# Patient Record
Sex: FEMALE | Race: WHITE | NOT HISPANIC OR LATINO
[De-identification: names, ages, dates, MRNs, and addresses within clinical notes are randomized per-mention and may not be internally consistent; named-entity substitution may affect disease eponyms.]

---

## 2018-10-09 PROBLEM — Z00.00 ENCOUNTER FOR PREVENTIVE HEALTH EXAMINATION: Status: ACTIVE | Noted: 2018-10-09

## 2018-10-18 ENCOUNTER — APPOINTMENT (OUTPATIENT)
Dept: OBGYN | Facility: CLINIC | Age: 28
End: 2018-10-18
Payer: COMMERCIAL

## 2018-10-18 VITALS — DIASTOLIC BLOOD PRESSURE: 70 MMHG | HEIGHT: 66 IN | SYSTOLIC BLOOD PRESSURE: 110 MMHG

## 2018-10-18 DIAGNOSIS — Z80.0 FAMILY HISTORY OF MALIGNANT NEOPLASM OF DIGESTIVE ORGANS: ICD-10-CM

## 2018-10-18 DIAGNOSIS — F41.8 OTHER SPECIFIED ANXIETY DISORDERS: ICD-10-CM

## 2018-10-18 DIAGNOSIS — Z78.9 OTHER SPECIFIED HEALTH STATUS: ICD-10-CM

## 2018-10-18 DIAGNOSIS — Z83.3 FAMILY HISTORY OF DIABETES MELLITUS: ICD-10-CM

## 2018-10-18 PROCEDURE — 99203 OFFICE O/P NEW LOW 30 MIN: CPT

## 2018-10-18 RX ORDER — ESCITALOPRAM OXALATE 10 MG/1
10 TABLET, FILM COATED ORAL
Refills: 0 | Status: ACTIVE | COMMUNITY

## 2019-03-05 ENCOUNTER — APPOINTMENT (OUTPATIENT)
Dept: OBGYN | Facility: CLINIC | Age: 29
End: 2019-03-05
Payer: COMMERCIAL

## 2019-03-05 VITALS
HEIGHT: 66 IN | WEIGHT: 260 LBS | SYSTOLIC BLOOD PRESSURE: 118 MMHG | DIASTOLIC BLOOD PRESSURE: 80 MMHG | BODY MASS INDEX: 41.78 KG/M2

## 2019-03-05 DIAGNOSIS — R39.11 HESITANCY OF MICTURITION: ICD-10-CM

## 2019-03-05 PROCEDURE — 99213 OFFICE O/P EST LOW 20 MIN: CPT

## 2019-05-14 ENCOUNTER — APPOINTMENT (OUTPATIENT)
Dept: OBGYN | Facility: CLINIC | Age: 29
End: 2019-05-14
Payer: COMMERCIAL

## 2019-05-14 VITALS
HEIGHT: 66 IN | BODY MASS INDEX: 44.36 KG/M2 | WEIGHT: 276 LBS | HEART RATE: 79 BPM | DIASTOLIC BLOOD PRESSURE: 78 MMHG | SYSTOLIC BLOOD PRESSURE: 121 MMHG

## 2019-05-14 PROCEDURE — 99395 PREV VISIT EST AGE 18-39: CPT

## 2019-05-14 NOTE — PHYSICAL EXAM
[Awake] : awake [Alert] : alert [Soft] : soft [Oriented x3] : oriented to person, place, and time [Labia Minora] : labia minora [Normal] : clitoris [Labia Majora] : labia major [No Bleeding] : there was no active vaginal bleeding [Pap Obtained] : a Pap smear was performed [Uterine Adnexae] : were not tender and not enlarged [LAD] : no lymphadenopathy [Acute Distress] : no acute distress [Thyroid Nodule] : no thyroid nodule [Goiter] : no goiter [Mass] : no breast mass [Axillary LAD] : no axillary lymphadenopathy [Nipple Discharge] : no nipple discharge [H/Smegaly] : no hepatosplenomegaly [Tender] : non tender [Distended] : not distended [Depressed Mood] : not depressed [Flat Affect] : affect not flat

## 2019-05-14 NOTE — COUNSELING
[Exercise] : exercise [Nutrition] : nutrition [Breast Self Exam] : breast self exam [Vitamins/Supplements] : vitamins/supplements [Contraception] : contraception

## 2019-05-14 NOTE — HISTORY OF PRESENT ILLNESS
[6 Months Ago] : 6 months ago [Good] : being in good health [Last Pap ___] : Last cervical pap smear was [unfilled] [Healthy Diet] : a healthy diet [Weight Concerns] : weight concens

## 2019-05-17 LAB — CYTOLOGY CVX/VAG DOC THIN PREP: NORMAL

## 2019-07-22 ENCOUNTER — MEDICATION RENEWAL (OUTPATIENT)
Age: 29
End: 2019-07-22

## 2020-03-10 ENCOUNTER — EMERGENCY (EMERGENCY)
Facility: HOSPITAL | Age: 30
LOS: 1 days | Discharge: HOME | End: 2020-03-10
Attending: EMERGENCY MEDICINE
Payer: COMMERCIAL

## 2020-03-10 VITALS
HEIGHT: 66 IN | TEMPERATURE: 99 F | RESPIRATION RATE: 16 BRPM | HEART RATE: 96 BPM | DIASTOLIC BLOOD PRESSURE: 80 MMHG | WEIGHT: 279.99 LBS | OXYGEN SATURATION: 100 % | SYSTOLIC BLOOD PRESSURE: 129 MMHG

## 2020-03-10 VITALS
HEART RATE: 80 BPM | OXYGEN SATURATION: 97 % | RESPIRATION RATE: 18 BRPM | DIASTOLIC BLOOD PRESSURE: 71 MMHG | SYSTOLIC BLOOD PRESSURE: 106 MMHG

## 2020-03-10 DIAGNOSIS — F32.9 MAJOR DEPRESSIVE DISORDER, SINGLE EPISODE, UNSPECIFIED: ICD-10-CM

## 2020-03-10 DIAGNOSIS — F41.9 ANXIETY DISORDER, UNSPECIFIED: ICD-10-CM

## 2020-03-10 LAB
ALBUMIN SERPL ELPH-MCNC: 4.4 G/DL — SIGNIFICANT CHANGE UP (ref 3.3–5.2)
ALP SERPL-CCNC: 71 U/L — SIGNIFICANT CHANGE UP (ref 40–120)
ALT FLD-CCNC: 21 U/L — SIGNIFICANT CHANGE UP
ANION GAP SERPL CALC-SCNC: 12 MMOL/L — SIGNIFICANT CHANGE UP (ref 5–17)
APAP SERPL-MCNC: <7.5 UG/ML — LOW (ref 10–26)
APPEARANCE UR: CLEAR — SIGNIFICANT CHANGE UP
AST SERPL-CCNC: 21 U/L — SIGNIFICANT CHANGE UP
BASOPHILS # BLD AUTO: 0.03 K/UL — SIGNIFICANT CHANGE UP (ref 0–0.2)
BASOPHILS NFR BLD AUTO: 0.3 % — SIGNIFICANT CHANGE UP (ref 0–2)
BILIRUB SERPL-MCNC: 0.3 MG/DL — LOW (ref 0.4–2)
BILIRUB UR-MCNC: NEGATIVE — SIGNIFICANT CHANGE UP
BUN SERPL-MCNC: 7 MG/DL — LOW (ref 8–20)
CALCIUM SERPL-MCNC: 9.2 MG/DL — SIGNIFICANT CHANGE UP (ref 8.6–10.2)
CHLORIDE SERPL-SCNC: 101 MMOL/L — SIGNIFICANT CHANGE UP (ref 98–107)
CO2 SERPL-SCNC: 23 MMOL/L — SIGNIFICANT CHANGE UP (ref 22–29)
COLOR SPEC: YELLOW — SIGNIFICANT CHANGE UP
CREAT SERPL-MCNC: 0.55 MG/DL — SIGNIFICANT CHANGE UP (ref 0.5–1.3)
DIFF PNL FLD: ABNORMAL
EOSINOPHIL # BLD AUTO: 0.15 K/UL — SIGNIFICANT CHANGE UP (ref 0–0.5)
EOSINOPHIL NFR BLD AUTO: 1.3 % — SIGNIFICANT CHANGE UP (ref 0–6)
ETHANOL SERPL-MCNC: <10 MG/DL — SIGNIFICANT CHANGE UP
GLUCOSE SERPL-MCNC: 85 MG/DL — SIGNIFICANT CHANGE UP (ref 70–99)
GLUCOSE UR QL: NEGATIVE MG/DL — SIGNIFICANT CHANGE UP
HCG UR QL: NEGATIVE — SIGNIFICANT CHANGE UP
HCT VFR BLD CALC: 42.7 % — SIGNIFICANT CHANGE UP (ref 34.5–45)
HGB BLD-MCNC: 13.7 G/DL — SIGNIFICANT CHANGE UP (ref 11.5–15.5)
IMM GRANULOCYTES NFR BLD AUTO: 0.3 % — SIGNIFICANT CHANGE UP (ref 0–1.5)
KETONES UR-MCNC: NEGATIVE — SIGNIFICANT CHANGE UP
LEUKOCYTE ESTERASE UR-ACNC: NEGATIVE — SIGNIFICANT CHANGE UP
LYMPHOCYTES # BLD AUTO: 3.7 K/UL — HIGH (ref 1–3.3)
LYMPHOCYTES # BLD AUTO: 32.3 % — SIGNIFICANT CHANGE UP (ref 13–44)
MCHC RBC-ENTMCNC: 27.5 PG — SIGNIFICANT CHANGE UP (ref 27–34)
MCHC RBC-ENTMCNC: 32.1 GM/DL — SIGNIFICANT CHANGE UP (ref 32–36)
MCV RBC AUTO: 85.6 FL — SIGNIFICANT CHANGE UP (ref 80–100)
MONOCYTES # BLD AUTO: 0.48 K/UL — SIGNIFICANT CHANGE UP (ref 0–0.9)
MONOCYTES NFR BLD AUTO: 4.2 % — SIGNIFICANT CHANGE UP (ref 2–14)
NEUTROPHILS # BLD AUTO: 7.06 K/UL — SIGNIFICANT CHANGE UP (ref 1.8–7.4)
NEUTROPHILS NFR BLD AUTO: 61.6 % — SIGNIFICANT CHANGE UP (ref 43–77)
NITRITE UR-MCNC: NEGATIVE — SIGNIFICANT CHANGE UP
PCP SPEC-MCNC: SIGNIFICANT CHANGE UP
PH UR: 8 — SIGNIFICANT CHANGE UP (ref 5–8)
PLATELET # BLD AUTO: 296 K/UL — SIGNIFICANT CHANGE UP (ref 150–400)
POTASSIUM SERPL-MCNC: 3.9 MMOL/L — SIGNIFICANT CHANGE UP (ref 3.5–5.3)
POTASSIUM SERPL-SCNC: 3.9 MMOL/L — SIGNIFICANT CHANGE UP (ref 3.5–5.3)
PROT SERPL-MCNC: 7.6 G/DL — SIGNIFICANT CHANGE UP (ref 6.6–8.7)
PROT UR-MCNC: NEGATIVE MG/DL — SIGNIFICANT CHANGE UP
RBC # BLD: 4.99 M/UL — SIGNIFICANT CHANGE UP (ref 3.8–5.2)
RBC # FLD: 12.9 % — SIGNIFICANT CHANGE UP (ref 10.3–14.5)
SALICYLATES SERPL-MCNC: <0.6 MG/DL — LOW (ref 10–20)
SODIUM SERPL-SCNC: 136 MMOL/L — SIGNIFICANT CHANGE UP (ref 135–145)
SP GR SPEC: 1.01 — SIGNIFICANT CHANGE UP (ref 1.01–1.02)
UROBILINOGEN FLD QL: NEGATIVE MG/DL — SIGNIFICANT CHANGE UP
WBC # BLD: 11.45 K/UL — HIGH (ref 3.8–10.5)
WBC # FLD AUTO: 11.45 K/UL — HIGH (ref 3.8–10.5)

## 2020-03-10 PROCEDURE — 99285 EMERGENCY DEPT VISIT HI MDM: CPT

## 2020-03-10 PROCEDURE — 81001 URINALYSIS AUTO W/SCOPE: CPT

## 2020-03-10 PROCEDURE — 99283 EMERGENCY DEPT VISIT LOW MDM: CPT

## 2020-03-10 PROCEDURE — 90792 PSYCH DIAG EVAL W/MED SRVCS: CPT

## 2020-03-10 PROCEDURE — 80053 COMPREHEN METABOLIC PANEL: CPT

## 2020-03-10 PROCEDURE — 80307 DRUG TEST PRSMV CHEM ANLYZR: CPT

## 2020-03-10 PROCEDURE — 81025 URINE PREGNANCY TEST: CPT

## 2020-03-10 PROCEDURE — 85027 COMPLETE CBC AUTOMATED: CPT

## 2020-03-10 PROCEDURE — 36415 COLL VENOUS BLD VENIPUNCTURE: CPT

## 2020-03-10 RX ORDER — ESCITALOPRAM OXALATE 10 MG/1
1 TABLET, FILM COATED ORAL
Qty: 15 | Refills: 1
Start: 2020-03-10 | End: 2020-04-08

## 2020-03-10 NOTE — ED ADULT TRIAGE NOTE - CHIEF COMPLAINT QUOTE
Pt with a h/o anxiety and depression on Lexapro recently started going to therapy last week. Today was her 2nd session and the doctor suggested that she come here to admit herself for suicidal ideation. Pt reports that she has no plan. Denies any recent new stressors.

## 2020-03-10 NOTE — ED ADULT NURSE NOTE - OBJECTIVE STATEMENT
Patient is a 29 year old female, A&Ox3 in no apparent distress. c/o psychiatric evaluation. Patient presents with anxiety and depression and was unable to make an appointment with a therapist. Patient states she wanted to admit herself for depression, anxiety, and suicidal ideations with no plan.

## 2020-03-10 NOTE — ED BEHAVIORAL HEALTH ASSESSMENT NOTE - RISK ASSESSMENT
Despite chronic passive suicidal ideation, patient has no h/o prior suicide attempt, denies any current S/H I/I/P, has good support, is help seeking, future oriented with multiple protective factors. Low Acute Suicide Risk

## 2020-03-10 NOTE — ED ADULT TRIAGE NOTE - NS_BH TRG Q4C_ED_A_ED
Justyna Patel MA     No

## 2020-03-10 NOTE — ED ADULT TRIAGE NOTE - NS ED TRIAGE AVPU SCALE
Problem: Potential for Falls  Goal: Patient will remain free of falls  INTERVENTIONS:  - Assess patient frequently for physical needs  -  Identify cognitive and physical deficits and behaviors that affect risk of falls  -  [place] fall precautions as indicated by assessment   - Educate patient/family on patient safety including physical limitations  - Instruct patient to call for assistance with activity based on assessment  - Modify environment to reduce risk of injury  - Consider OT/PT consult to assist with strengthening/mobility   Outcome: Progressing      Problem: SAFETY ADULT  Goal: Patient will remain free of falls  INTERVENTIONS:  - Assess patient frequently for physical needs  -  Identify cognitive and physical deficits and behaviors that affect risk of falls  -  [place]rington fall precautions as indicated by assessment   - Educate patient/family on patient safety including physical limitations  - Instruct patient to call for assistance with activity based on assessment  - Modify environment to reduce risk of injury  - Consider OT/PT consult to assist with strengthening/mobility   Outcome: Progressing      Problem: Knowledge Deficit  Goal: Patient/family/caregiver demonstrates understanding of disease process, treatment plan, medications, and discharge instructions  Complete learning assessment and assess knowledge base    Interventions:  - Provide teaching at level of understanding  - Provide teaching via preferred learning methods  Outcome: Progressing
Alert-The patient is alert, awake and responds to voice. The patient is oriented to time, place, and person. The triage nurse is able to obtain subjective information.

## 2020-03-10 NOTE — ED BEHAVIORAL HEALTH ASSESSMENT NOTE - SAFETY PLAN ADDT'L DETAILS
SCOTT FROM [facility] REQUESTING ORDER BONE DENTISITY TEST. PLEASE ADVISE.      [phone]   Provision of National Suicide Prevention Lifeline 5-260-125-QBPU (5948)/Safety plan discussed with.../Education provided regarding environmental safety / lethal means restriction

## 2020-03-10 NOTE — ED STATDOCS - PROGRESS NOTE DETAILS
Psych made aware of patient Patient cleared by psych medication increased by psych physician to 15mg of Lexapro.  Patient will need to continue to try to call the NP to get an appointment.  Unable to get a sooner appointment with NIDHI.

## 2020-03-10 NOTE — ED BEHAVIORAL HEALTH ASSESSMENT NOTE - DIFFERENTIAL
Anxiety disorder unspecified, depressive disorder unspecified r/o cannabis abuse r/o PTSD r/o substance induced anxiety
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
## 2020-03-10 NOTE — ED STATDOCS - CLINICAL SUMMARY MEDICAL DECISION MAKING FREE TEXT BOX
Patient with worsening anxiety and depression. Was sent by therapist. Patient to be evaluated by psych, and hopefully get family service league appointment by social work.
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
## 2020-03-10 NOTE — ED BEHAVIORAL HEALTH ASSESSMENT NOTE - SUMMARY
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 Patient with long standing depressive, anxiety and passive suicidal ideation.  Had first session with therapist last week which reactivated memories of prior trauma. For several days she felt more anxious, depressed and isolated more at home. She denies any active S/H I/I/P, has no concerns of hurting herself and reports reasons for living and optimistic about future. BF has no safety concerns.  No psychotic or manic sx's were elicited. She has returned to work and is functioning well. She does reports chronic daily panic attacks.  She is in process of scheduling appointment with Katie Cortez NP.  Will clear for outpatient follow up.

## 2020-03-10 NOTE — ED BEHAVIORAL HEALTH ASSESSMENT NOTE - MEDICATIONS (PRESCRIPTIONS, DIRECTIONS)
Will increase lexapro to 15 mg daily. Patient had good response when lexapro was started. Risks, benefits and common side effects were discussed.  Will send two week supply of lexapro 5 mg daily with one refill to pharmacy (to take in addition to current 10 mg)
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
## 2020-03-10 NOTE — ED BEHAVIORAL HEALTH ASSESSMENT NOTE - DESCRIPTION
Patient was calm, cooperative, mildly anxious, appears forthcoming, in no acute distress.     Vital Signs Last 24 Hrs  T(C): 37.1 (10 Mar 2020 18:53), Max: 37.1 (10 Mar 2020 18:53)  T(F): 98.7 (10 Mar 2020 18:53), Max: 98.7 (10 Mar 2020 18:53)  HR: 80 (10 Mar 2020 22:15) (80 - 96)  BP: 106/71 (10 Mar 2020 22:15) (106/71 - 129/80)  BP(mean): --  RR: 18 (10 Mar 2020 22:15) (16 - 18)  SpO2: 97% (10 Mar 2020 22:15) (97% - 100%) endometriosis, IBS Born in . Parents  when patient was 5 or 6.  Has to 1/2 sisters.  Lived primarily with mom.

## 2020-03-10 NOTE — ED STATDOCS - OBJECTIVE STATEMENT
28 y/o F hx of depression and anxiety(on Lexapro) c/o worsening depression, anxiety and intermittent SI. She states that her Lexapro was prescribed in 2017, and she takes 10 mg daily. 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 Patient saw a new therapist last week, and reports feeling worse after seeing them. Patient has been staying on her couch, feeling more depressed, and having more significant suicidal thoughts with no plan. Her significant other at bedside denies the patient being a threat to herself. Patient tried calling other therapist's numbers that she was given, but was unable to make an appointment. She went back to her therapist today, and was told to come to the ED to be admitted to the ED. Denies fever, EtOH consumption, or overdosing on medications.

## 2020-03-10 NOTE — ED STATDOCS - NSFOLLOWUPINSTRUCTIONS_ED_ALL_ED_FT
Take medication as prescribed.  Follow-up with your therapist and continue to try to make an appointment with psychiatrist.  If your symptoms worsen or if any other worrisome symptoms develop return to the ER.

## 2020-07-13 PROBLEM — F41.9 ANXIETY DISORDER, UNSPECIFIED: Chronic | Status: ACTIVE | Noted: 2020-03-20

## 2020-07-13 PROBLEM — F32.9 MAJOR DEPRESSIVE DISORDER, SINGLE EPISODE, UNSPECIFIED: Chronic | Status: ACTIVE | Noted: 2020-03-20

## 2020-07-21 ENCOUNTER — APPOINTMENT (OUTPATIENT)
Dept: OBGYN | Facility: CLINIC | Age: 30
End: 2020-07-21
Payer: COMMERCIAL

## 2020-07-21 VITALS
WEIGHT: 290 LBS | BODY MASS INDEX: 46.61 KG/M2 | HEIGHT: 66 IN | SYSTOLIC BLOOD PRESSURE: 168 MMHG | DIASTOLIC BLOOD PRESSURE: 80 MMHG

## 2020-07-21 PROCEDURE — 99395 PREV VISIT EST AGE 18-39: CPT

## 2020-07-21 NOTE — HISTORY OF PRESENT ILLNESS
[1 Year Ago] : 1 year ago [Good] : being in good health [Reproductive Age] : is of reproductive age [Regular Exercise] : regular exercise [Healthy Diet] : a healthy diet [Sexually Active] : is sexually active [Male ___] : [unfilled] male

## 2020-07-21 NOTE — COUNSELING
[Breast Self Exam] : breast self exam [Nutrition] : nutrition [Exercise] : exercise [Weight Management] : weight management [Contraception] : contraception

## 2020-07-21 NOTE — PHYSICAL EXAM
[Awake] : awake [Alert] : alert [LAD] : no lymphadenopathy [Acute Distress] : no acute distress [Thyroid Nodule] : no thyroid nodule [Goiter] : no goiter [Nipple Discharge] : no nipple discharge [Mass] : no breast mass [Axillary LAD] : no axillary lymphadenopathy [Soft] : soft [Distended] : not distended [Tender] : non tender [Oriented x3] : oriented to person, place, and time [H/Smegaly] : no hepatosplenomegaly [Depressed Mood] : not depressed [Flat Affect] : affect not flat [Labia Majora] : labia major [Labia Minora] : labia minora [Normal] : clitoris [No Bleeding] : there was no active vaginal bleeding [Pap Obtained] : a Pap smear was performed [Uterine Adnexae] : were not tender and not enlarged

## 2020-07-23 LAB
C TRACH RRNA SPEC QL NAA+PROBE: NOT DETECTED
N GONORRHOEA RRNA SPEC QL NAA+PROBE: NOT DETECTED
SOURCE TP AMPLIFICATION: NORMAL

## 2020-07-25 LAB — CYTOLOGY CVX/VAG DOC THIN PREP: NORMAL

## 2020-09-15 ENCOUNTER — APPOINTMENT (OUTPATIENT)
Dept: OBGYN | Facility: CLINIC | Age: 30
End: 2020-09-15
Payer: COMMERCIAL

## 2020-09-15 ENCOUNTER — RESULT CHARGE (OUTPATIENT)
Age: 30
End: 2020-09-15

## 2020-09-15 VITALS
HEIGHT: 66 IN | BODY MASS INDEX: 45 KG/M2 | WEIGHT: 280 LBS | SYSTOLIC BLOOD PRESSURE: 125 MMHG | DIASTOLIC BLOOD PRESSURE: 85 MMHG

## 2020-09-15 DIAGNOSIS — N92.6 IRREGULAR MENSTRUATION, UNSPECIFIED: ICD-10-CM

## 2020-09-15 LAB
HCG UR QL: NEGATIVE
QUALITY CONTROL: YES

## 2020-09-15 PROCEDURE — 81025 URINE PREGNANCY TEST: CPT

## 2020-09-15 PROCEDURE — 99214 OFFICE O/P EST MOD 30 MIN: CPT

## 2020-09-15 NOTE — COUNSELING
[Contraception/ Emergency Contraception/ Safe Sexual Practices] : contraception, emergency contraception, safe sexual practices

## 2020-09-15 NOTE — DISCUSSION/SUMMARY
[FreeTextEntry1] : disc. with pt that the test is neg will do blood work. she will obstain from sex and restart oc after test results.

## 2020-09-15 NOTE — HISTORY OF PRESENT ILLNESS
[FreeTextEntry1] : 30 yo  here because she had a positive pregnancy test at home. SHe is on continuous OC for endometriosis and she felt nauseous and took 4 tests over 2 days and they were +. she than stopped her oc and bleed for 2-3 days did another test and it was negative, her UCG today is negative. SHe denies any pain or sx today.

## 2021-08-31 ENCOUNTER — NON-APPOINTMENT (OUTPATIENT)
Age: 31
End: 2021-08-31

## 2021-08-31 ENCOUNTER — APPOINTMENT (OUTPATIENT)
Dept: OBGYN | Facility: CLINIC | Age: 31
End: 2021-08-31
Payer: COMMERCIAL

## 2021-08-31 VITALS
WEIGHT: 275 LBS | DIASTOLIC BLOOD PRESSURE: 70 MMHG | SYSTOLIC BLOOD PRESSURE: 120 MMHG | BODY MASS INDEX: 44.2 KG/M2 | HEIGHT: 66 IN

## 2021-08-31 DIAGNOSIS — Z86.39 PERSONAL HISTORY OF OTHER ENDOCRINE, NUTRITIONAL AND METABOLIC DISEASE: ICD-10-CM

## 2021-08-31 DIAGNOSIS — Z87.42 PERSONAL HISTORY OF OTHER DISEASES OF THE FEMALE GENITAL TRACT: ICD-10-CM

## 2021-08-31 DIAGNOSIS — N94.89 OTHER SPECIFIED CONDITIONS ASSOCIATED WITH FEMALE GENITAL ORGANS AND MENSTRUAL CYCLE: ICD-10-CM

## 2021-08-31 PROCEDURE — 99395 PREV VISIT EST AGE 18-39: CPT

## 2021-08-31 RX ORDER — SILDENAFIL CITRATE 100 %
POWDER (GRAM) MISCELLANEOUS
Qty: 1 | Refills: 1 | Status: ACTIVE | COMMUNITY
Start: 2021-08-31 | End: 1900-01-01

## 2021-08-31 NOTE — HISTORY OF PRESENT ILLNESS
[FreeTextEntry1] : 29 yo here for av, she has a hx of endometriosis found during laparoscopic exam at Dr. jonas, she has been on continuous junel1/20 and doing well . Recently she s.o pelvic congestion feling but also has ibs. SHe has the same partner. . She also comp. of decreased libido they just added welbutrin to her lexapro. We disc mirena iud also scream cream

## 2021-08-31 NOTE — DISCUSSION/SUMMARY
[FreeTextEntry1] : no smoking, RBAD .\par Discussed the risks of DVT and blood clots,strokes\par  good and bad side effects of the pill discussed and instructions on how to take pills and when to use back up. \par Encouraged exercise , \par good diet filled with,plant based foods, calcium and vit.D.rtn 6 months. \par Discussed SBE,\par Discussed the NIH suggests minimum of 2.5 hours of exercise a week\par If contracted covid call office to change to progesterone only pill(Slynd) for 3 months) DIsc. hypercoagulative state/or ASA therapy low dose\par Answered any questions she may have.\par discussed mirena iud \par exercise, and sent over scream cream for  low libido

## 2021-09-07 LAB — CYTOLOGY CVX/VAG DOC THIN PREP: NORMAL

## 2022-05-17 ENCOUNTER — RX RENEWAL (OUTPATIENT)
Age: 32
End: 2022-05-17

## 2022-07-20 ENCOUNTER — RX RENEWAL (OUTPATIENT)
Age: 32
End: 2022-07-20

## 2022-09-13 ENCOUNTER — APPOINTMENT (OUTPATIENT)
Dept: OBGYN | Facility: CLINIC | Age: 32
End: 2022-09-13

## 2022-09-13 ENCOUNTER — NON-APPOINTMENT (OUTPATIENT)
Age: 32
End: 2022-09-13

## 2022-09-13 VITALS
HEIGHT: 66 IN | DIASTOLIC BLOOD PRESSURE: 80 MMHG | SYSTOLIC BLOOD PRESSURE: 124 MMHG | WEIGHT: 293 LBS | BODY MASS INDEX: 47.09 KG/M2

## 2022-09-13 DIAGNOSIS — Z30.41 ENCOUNTER FOR SURVEILLANCE OF CONTRACEPTIVE PILLS: ICD-10-CM

## 2022-09-13 DIAGNOSIS — R10.2 PELVIC AND PERINEAL PAIN: ICD-10-CM

## 2022-09-13 DIAGNOSIS — Z01.419 ENCOUNTER FOR GYNECOLOGICAL EXAMINATION (GENERAL) (ROUTINE) W/OUT ABNORMAL FINDINGS: ICD-10-CM

## 2022-09-13 DIAGNOSIS — R68.82 DECREASED LIBIDO: ICD-10-CM

## 2022-09-13 PROCEDURE — 99395 PREV VISIT EST AGE 18-39: CPT

## 2022-09-13 RX ORDER — BUPROPION HYDROCHLORIDE 150 MG/1
150 TABLET, EXTENDED RELEASE ORAL
Qty: 90 | Refills: 0 | Status: COMPLETED | COMMUNITY
Start: 2021-05-13 | End: 2022-09-13

## 2022-09-13 RX ORDER — NORETHINDRONE ACETATE AND ETHINYL ESTRADIOL AND FERROUS FUMARATE 1MG-20(21)
1-20 KIT ORAL
Qty: 84 | Refills: 1 | Status: ACTIVE | COMMUNITY
Start: 2018-10-18 | End: 1900-01-01

## 2022-09-13 NOTE — HISTORY OF PRESENT ILLNESS
[FreeTextEntry1] : 32 yo here for av, she has a hx of endometriosis, last surgery 2013 with Dr clarke.\par SHe has been doing well but  she states that 6 monhts ago she becane having some vaginal pain at the opening  on and off. not necessarily after sex. \par SHe is coming off of her depressant meds and also may want to stop the pills . \par WE disc the endometriosis and pills and iud.  [Currently Active] : currently active [Men] : men [Vaginal] : vaginal [No] : No

## 2022-09-13 NOTE — DISCUSSION/SUMMARY
[FreeTextEntry1] : pt may be going off of oc soon, will go to pt pelvic floor has pain and some leaking mixed\par vag sono\par  disc iud, \par no smoking, RBAD .\par Discussed the risks of DVT and blood clots,strokes\par  good and bad side effects of the pill discussed and instructions on how to take pills and when to use back up. \par Encouraged exercise , \par good diet filled with,plant based foods, calcium and vit.D.rtn 6 months. \par Discussed SBE,\par Discussed the NIH suggests minimum of 2.5 hours of exercise a week\par If contracted covid call office to change to progesterone only pill(Slynd) for 3 months) DIsc. hypercoagulative state/or ASA therapy low dose\par Answered any questions she may have.\par

## 2022-09-17 LAB — CYTOLOGY CVX/VAG DOC THIN PREP: NORMAL

## 2022-10-04 ENCOUNTER — ASOB RESULT (OUTPATIENT)
Age: 32
End: 2022-10-04

## 2022-10-04 ENCOUNTER — APPOINTMENT (OUTPATIENT)
Dept: ANTEPARTUM | Facility: CLINIC | Age: 32
End: 2022-10-04

## 2022-10-04 ENCOUNTER — APPOINTMENT (OUTPATIENT)
Dept: OBGYN | Facility: CLINIC | Age: 32
End: 2022-10-04

## 2022-10-04 VITALS
SYSTOLIC BLOOD PRESSURE: 109 MMHG | HEIGHT: 66 IN | WEIGHT: 293 LBS | BODY MASS INDEX: 47.09 KG/M2 | DIASTOLIC BLOOD PRESSURE: 77 MMHG

## 2022-10-04 DIAGNOSIS — N80.9 ENDOMETRIOSIS, UNSPECIFIED: ICD-10-CM

## 2022-10-04 PROCEDURE — 76830 TRANSVAGINAL US NON-OB: CPT

## 2022-10-04 PROCEDURE — 76856 US EXAM PELVIC COMPLETE: CPT | Mod: 59

## 2022-10-04 PROCEDURE — 99213 OFFICE O/P EST LOW 20 MIN: CPT

## 2022-10-04 NOTE — HISTORY OF PRESENT ILLNESS
[FreeTextEntry1] : 30 yo with a hx of endometriosis here for sono. SHe had surgery yrs ago and has been doing well but the pain seems to be returning. . SHe remains on continuous BC. and does well.

## 2022-10-04 NOTE — DISCUSSION/SUMMARY
[FreeTextEntry1] : disc with pt the results of the sono. \par  MR of pelvis ordered \par Pe;lvic floor PT disc. stars referral.

## 2023-03-07 ENCOUNTER — NON-APPOINTMENT (OUTPATIENT)
Age: 33
End: 2023-03-07

## 2023-12-27 ENCOUNTER — NON-APPOINTMENT (OUTPATIENT)
Age: 33
End: 2023-12-27

## 2024-04-10 ENCOUNTER — NON-APPOINTMENT (OUTPATIENT)
Age: 34
End: 2024-04-10

## 2024-08-14 NOTE — ED STATDOCS - CPE ED RESP NORM
normal...
Dizziness  Dizziness is a common problem. It makes you feel unsteady or light-headed. You may feel like you're about to faint. Dizziness can lead to getting hurt if you stumble or fall.    It's more common to feel dizzy if you're an older adult. Many things can cause you to feel dizzy. These include:  Medicines.  Dehydration. This is when there's not enough water in your body.  Illness.  Follow these instructions at home:  Eating and drinking    A person drinking water from a glass.  Drink enough fluid to keep your pee (urine) pale yellow.  This helps keep you from getting dehydrated.  Try to drink more clear fluids, such as water.  Do not drink alcohol.  Try to limit how much caffeine you take in.  Try to limit how much salt, also called sodium, you take in.  Activity    Try not to make quick movements.  Stand up slowly from sitting in a chair. Steady yourself until you feel okay.  In the morning, first sit up on the side of the bed. When you feel okay, hold onto something and slowly stand up. Do this until you know that your balance is okay.  If you need to  one place for a long time, move your legs often. Tighten and relax the muscles in your legs while you're standing.  Do not drive or use machines if you feel dizzy.  Avoid bending down if you feel dizzy. Place items in your home so you can reach them without leaning over.  Lifestyle    Do not smoke, vape, or use products with nicotine or tobacco in them. If you need help quitting, talk with your health care provider.  Try to lower your stress level. You can do this by using methods like yoga or meditation. Talk with your provider if you need help.  General instructions    Watch your dizziness for any changes.  Take your medicines only as told by your provider. Talk with your provider if you think you're dizzy because of a medicine you're taking.  Tell a friend or a family member that you're feeling dizzy. If they spot any changes in your behavior, have them call your provider.  Contact a health care provider if:  Your dizziness doesn't go away, or you have new symptoms.  Your dizziness gets worse.  You feel like you may vomit.  You have trouble hearing.  You have a fever.  You have neck pain or a stiff neck.  You fall or get hurt.  Get help right away if:  You vomit each time you eat or drink.  You have watery poop and can't eat or drink.  You have trouble talking, walking, swallowing, or using your arms, hands, or legs.  You feel very weak.  You're bleeding.  You're not thinking clearly, or you have trouble forming sentences. A friend or family member may spot this.  Your vision changes, or you get a very bad headache.  These symptoms may be an emergency. Call 911 right away.  Do not wait to see if the symptoms will go away.  Do not drive yourself to the hospital.  This information is not intended to replace advice given to you by your health care provider. Make sure you discuss any questions you have with your health care provider.    Document Revised: 02/01/2024 Document Reviewed: 02/01/2024  Elsevier Patient Education © 2024 [employer]

## 2024-08-26 NOTE — ED STATDOCS - PATIENT PORTAL LINK FT
no ROM deficits were identified
Right hip flexion limited due to femoral shiley/bilateral upper extremity ROM was WFL (within functional limits)/bilateral lower extremity ROM was WFL (within functional limits)
You can access the FollowMyHealth Patient Portal offered by  by registering at the following website: [email or web address]. By joining [facility]’s FollowMyHealth portal, you will also be able to view your health information using other applications (apps) compatible with our system.